# Patient Record
Sex: MALE | Race: WHITE | NOT HISPANIC OR LATINO | URBAN - METROPOLITAN AREA
[De-identification: names, ages, dates, MRNs, and addresses within clinical notes are randomized per-mention and may not be internally consistent; named-entity substitution may affect disease eponyms.]

---

## 2023-08-18 ENCOUNTER — OFFICE VISIT (OUTPATIENT)
Dept: PRIMARY CARE | Facility: CLINIC | Age: 48
End: 2023-08-18
Payer: COMMERCIAL

## 2023-08-18 VITALS
SYSTOLIC BLOOD PRESSURE: 130 MMHG | HEART RATE: 110 BPM | DIASTOLIC BLOOD PRESSURE: 82 MMHG | BODY MASS INDEX: 26.07 KG/M2 | WEIGHT: 176 LBS | HEIGHT: 69 IN

## 2023-08-18 DIAGNOSIS — E78.00 ELEVATED LDL CHOLESTEROL LEVEL: ICD-10-CM

## 2023-08-18 DIAGNOSIS — Z12.11 COLON CANCER SCREENING: ICD-10-CM

## 2023-08-18 DIAGNOSIS — Z00.00 HEALTHCARE MAINTENANCE: Primary | ICD-10-CM

## 2023-08-18 PROBLEM — R00.0 TACHYCARDIA: Status: ACTIVE | Noted: 2023-08-18

## 2023-08-18 PROBLEM — R00.2 PALPITATIONS: Status: ACTIVE | Noted: 2023-08-18

## 2023-08-18 PROBLEM — I51.7 LEFT VENTRICULAR HYPERTROPHY: Status: ACTIVE | Noted: 2023-08-18

## 2023-08-18 PROCEDURE — 4004F PT TOBACCO SCREEN RCVD TLK: CPT | Performed by: STUDENT IN AN ORGANIZED HEALTH CARE EDUCATION/TRAINING PROGRAM

## 2023-08-18 PROCEDURE — 93000 ELECTROCARDIOGRAM COMPLETE: CPT | Performed by: STUDENT IN AN ORGANIZED HEALTH CARE EDUCATION/TRAINING PROGRAM

## 2023-08-18 PROCEDURE — 99396 PREV VISIT EST AGE 40-64: CPT | Performed by: STUDENT IN AN ORGANIZED HEALTH CARE EDUCATION/TRAINING PROGRAM

## 2023-08-18 ASSESSMENT — PATIENT HEALTH QUESTIONNAIRE - PHQ9
SUM OF ALL RESPONSES TO PHQ9 QUESTIONS 1 AND 2: 0
2. FEELING DOWN, DEPRESSED OR HOPELESS: NOT AT ALL
1. LITTLE INTEREST OR PLEASURE IN DOING THINGS: NOT AT ALL

## 2023-08-18 NOTE — PROGRESS NOTES
"Subjective   Patient ID: Joseph Palladino is a 48 y.o. male who presents for Annual Exam.  Today he is accompanied by alone.     HPI  Health Maintenance  Overall patient is doing well.   Immunization: UTD on all including: Tdap 1/17/2022    Colon Cancer Screening: Overdue with pt requesting renewed Cologuard requisition today.   No family history of colon cancer   Diet: Well-balanced. Eliminated sugar from diet, limits carbs from diet, and drinks plenty of water.   Stops eating past 7:45 PM  Drinks 10-12 cups of coffee \"at least\" a day along with carbohydrate-based pre-workout   Exercise: Regularly exercises multiple times a week including spin class, Orange theory, and weights  Tobacco: Continues to smoke 2-3 cigarettes every morning   Chews 5-6 pieces of nicotine gum during the day  EtOH: Improved with pt drinking socially once per month.   No longer attends AA meetings but listens to podcast on way into work in AM.     2. Elevated LDL   Last lipid panel (4/8/2022) with cholesterol 195, , HDL 48.6, and TG 69.   Pt not taking any statins or other cholesterol medications.     3. Palpitations  Reports frequent, daily irregular heartbeats without associated chest pain or shortness of breath.  Followed by Cardiology (Dr. Garcia) last seen by MAT Tee on 12/5/2022.    Normal event monitor 8/11/2022 with lowest HR of 39 bpm and max HR of 72 bpm.  Concerned heart monitor may be inaccurate as he purposefully exercised while wearing monitor with max heart rate reaching 180-190 bpm.   Endorses drinking at least 10-12 cups of coffee daily along with carbohydrate-based pre-workout.     No current outpatient medications on file prior to visit.     No current facility-administered medications on file prior to visit.        No Known Allergies    Immunization History   Administered Date(s) Administered    Tdap vaccine, age 10 years and older (BOOSTRIX) 01/17/2022         Review of Systems  All pertinent positive symptoms " "are included in the history of present illness.  All other systems have been reviewed and are negative and noncontributory to this patient's current ailments.     Objective   /82   Pulse 110   Ht 1.753 m (5' 9\")   Wt 79.8 kg (176 lb)   BMI 25.99 kg/m²   BSA: 1.97 meters squared  No visits with results within 1 Month(s) from this visit.   Latest known visit with results is:   [facility]acy Encounter on 04/08/2022   Component Date Value Ref Range Status    PSA 04/08/2022 2.86  0.00 - 4.00 ng/mL Final    Comment: The FDA requires that the method used for PSA assay be   reported to the physician. Values obtained with different   assay methods must not be used interchangeably. This test  was performed at [facility] using the Access   [employer] PSA assay is a two-site immunoenzymatic sandwich   assay. The assay is approved for measurement of   prostate-specific antigen (PSA)in serum and may be used   in conjunction with a digital rectal examination in men   50 years and older as an aid in detection of prostate   cancer.  5-Alpha-reductase inhibitors (e.g. Proscar, Finasteride,   Avodart, Dutasteride and Natty) for the treatment of BPH   have been shown to lower PSA levels by an average of 50%   after 6 months of treatment.      Glucose 04/08/2022 100 (H)  74 - 99 mg/dL Final    Sodium 04/08/2022 139  136 - 145 mmol/L Final    Potassium 04/08/2022 4.4  3.5 - 5.3 mmol/L Final    Chloride 04/08/2022 104  98 - 107 mmol/L Final    Bicarbonate 04/08/2022 27  21 - 32 mmol/L Final    Anion Gap 04/08/2022 12  10 - 20 mmol/L Final    Urea Nitrogen 04/08/2022 18  6 - 23 mg/dL Final    Creatinine 04/08/2022 1.18  0.50 - 1.30 mg/dL Final    GFR MALE 04/08/2022 77  >90 mL/min/1.73m2 Final    Comment:  CALCULATIONS OF ESTIMATED GFR ARE PERFORMED   USING THE 2021 CKD-EPI STUDY REFIT EQUATION   WITHOUT THE RACE VARIABLE FOR THE IDMS-TRACEABLE   CREATININE " METHODS.    https://jasn.asnjournals.org/content/early/2021/09/22/ASN.3608220821      Calcium 04/08/2022 9.4  8.6 - 10.3 mg/dL Final    Albumin 04/08/2022 4.5  3.4 - 5.0 g/dL Final    Alkaline Phosphatase 04/08/2022 49  33 - 120 U/L Final    Total Protein 04/08/2022 7.4  6.4 - 8.2 g/dL Final    AST 04/08/2022 24  9 - 39 U/L Final    Total Bilirubin 04/08/2022 0.6  0.0 - 1.2 mg/dL Final    ALT (SGPT) 04/08/2022 24  10 - 52 U/L Final    Comment:  Patients treated with Sulfasalazine may generate    falsely decreased results for ALT.      WBC 04/08/2022 6.8  4.4 - 11.3 x10E9/L Final    RBC 04/08/2022 4.92  4.50 - 5.90 x10E12/L Final    Hemoglobin 04/08/2022 15.5  13.5 - 17.5 g/dL Final    Hematocrit 04/08/2022 45.6  41.0 - 52.0 % Final    MCV 04/08/2022 93  80 - 100 fL Final    MCHC 04/08/2022 34.0  32.0 - 36.0 g/dL Final    Platelets 04/08/2022 273  150 - 450 x10E9/L Final    RDW 04/08/2022 11.9  11.5 - 14.5 % Final    Neutrophils % 04/08/2022 54.3  40.0 - 80.0 % Final    Immature Granulocytes %, Automated 04/08/2022 0.1  0.0 - 0.9 % Final    Comment:  Immature Granulocyte Count (IG) includes promyelocytes,    myelocytes and metamyelocytes but does not include bands.   Percent differential counts (%) should be interpreted in the   context of the absolute cell counts (cells/L).      Lymphocytes % 04/08/2022 36.0  13.0 - 44.0 % Final    Monocytes % 04/08/2022 8.5  2.0 - 10.0 % Final    Eosinophils % 04/08/2022 0.7  0.0 - 6.0 % Final    Basophils % 04/08/2022 0.4  0.0 - 2.0 % Final    Neutrophils Absolute 04/08/2022 3.68  1.20 - 7.70 x10E9/L Final    Lymphocytes Absolute 04/08/2022 2.45  1.20 - 4.80 x10E9/L Final    Monocytes Absolute 04/08/2022 0.58  0.10 - 1.00 x10E9/L Final    Eosinophils Absolute 04/08/2022 0.05  0.00 - 0.70 x10E9/L Final    Basophils Absolute 04/08/2022 0.03  0.00 - 0.10 x10E9/L Final    Cholesterol 04/08/2022 195  0 - 199 mg/dL Final    Comment: .      AGE      DESIRABLE   BORDERLINE HIGH   HIGH      0-19 Y     0 - 169       170 - 199     >/= 200    20-24 Y     0 - 189       190 - 224     >/= 225         >24 Y     0 - 199       200 - 239     >/= 240   **All ranges are based on fasting samples. Specific   therapeutic targets will vary based on patient-specific   cardiac risk.  .   Pediatric guidelines reference:Pediatrics 2011, 128(S5).   Adult guidelines reference: NCEP ATPIII Guidelines,     TONIA 2001, 258:2486-97  .   Venipuncture immediately after or during the    administration of Metamizole may lead to falsely   low results. Testing should be performed immediately   prior to Metamizole dosing.      HDL 04/08/2022 48.6  mg/dL Final    Comment: .      AGE      VERY LOW   LOW     NORMAL    HIGH       0-19 Y       < 35   < 40     40-45     ----    20-24 Y       ----   < 40       >45     ----      >24 Y       ----   < 40     40-60      >60  .      Cholesterol/HDL Ratio 04/08/2022 4.0   Final    Comment: REF VALUES  DESIRABLE  < 3.4  HIGH RISK  > 5.0      LDL 04/08/2022 133 (H)  0 - 99 mg/dL Final    Comment: .                           NEAR      BORD      AGE      DESIRABLE  OPTIMAL    HIGH     HIGH     VERY HIGH     0-19 Y     0 - 109     ---    110-129   >/= 130     ----    20-24 Y     0 - 119     ---    120-159   >/= 160     ----      >24 Y     0 -  99   100-129  130-159   160-189     >/=190  .      VLDL 04/08/2022 14  0 - 40 mg/dL Final    Triglycerides 04/08/2022 69  0 - 149 mg/dL Final    Comment: .      AGE      DESIRABLE   BORDERLINE HIGH   HIGH     VERY HIGH   0 D-90 D    19 - 174         ----         ----        ----  91 D- 9 Y     0 -  74        75 -  99     >/= 100      ----    10-19 Y     0 -  89        90 - 129     >/= 130      ----    20-24 Y     0 - 114       115 - 149     >/= 150      ----         >24 Y     0 - 149       150 - 199    200- 499    >/= 500  .   Venipuncture immediately after or during the    administration of Metamizole may lead to falsely   low results. Testing should be performed  immediately   prior to Metamizole dosing.      TSH 04/08/2022 1.51  0.44 - 3.98 mIU/L Final    Comment:  TSH testing is performed using different testing    methodology at [facility] than at other    [facility] hospitals. Direct result comparisons should    only be made within the same method.      Hepatitis C Ab 04/08/2022 NONREACTIVE  NONREACTIVE Final    Comment:  Results from patients taking biotin supplements or receiving   high-dose biotin therapy should be interpreted with caution   due to possible interference with this test. Providers may    contact their local laboratory for further information.         Physical Exam  CONSTITUTIONAL - well nourished, well developed, looks like stated age, in no acute distress, not ill-appearing, and not tired appearing  SKIN - normal skin color and pigmentation, normal skin turgor without rash, lesions, or nodules visualized  HEAD - no trauma, normocephalic  EYES - pupils are equal and reactive to light, extraocular muscles are intact, and normal external exam  ENT - TM's intact, no injection, no signs of infection, uvula midline, normal tongue movement and throat normal, no exudate, nasal passage without discharge and patent  NECK - supple without rigidity, no neck mass was observed, no thyromegaly or thyroid nodules  CHEST - clear to auscultation, no wheezing, no crackles and no rales, good effort  CARDIAC - regular rate and regular rhythm, no skipped beats, no murmur  ABDOMEN - no organomegaly, soft, nontender, nondistended, normal bowel sounds, no guarding/rebound/rigidity, negative McBurney sign and negative Mike sign  EXTREMITIES - no edema, no deformities  NEUROLOGICAL - normal gait, normal balance, normal motor, no ataxia, DTRs equal and symmetrical; alert, oriented and no focal signs  PSYCHIATRIC - alert, pleasant and cordial, age-appropriate  IMMUNOLOGIC - no cervical lymphadenopathy     Assessment/Plan   Health Maintenance   Complete history and  physical examination was performed  UTD on Immunizations   Requisition for Colonoscopy placed today with pt to complete this at earliest mutual convenience.   EKG reveals normal sinus rhythm with frequent PAC's.   Requisition for CBC, CMP, TSH, lipid, A1c has been provided to you  We will notify of test results once available and make treatment recommendations accordingly    2. Tobacco cessation   Discussed importance of tobacco cessation and physiological effect of tobacco on cardiovascular and other health.     3. Elevated LDL   Reviewed elevated LDL level at 133 on lipid panel 4/8/2022.   Requisition for CT cardiac and repeat lipid panel placed today.   For now, continue with lifestyle modifications including diet, exercise, and EtOH/smoking cessation.     4. Palpitations  Reviewed event monitor (8/11/2022) and ECG findings indicated in chart.   Current caffeine intake most likely exacerbating current palpitations and PAC's.   Advised following up with Dr. Garcia about possibly repeating event monitor.   HR range of 39 and 72 despite vigorous exercise and recorded pulse of 180-190 on smart watch